# Patient Record
(demographics unavailable — no encounter records)

---

## 2025-02-27 NOTE — ADDENDUM
[FreeTextEntry1] : I, Markie Wesley wrote this note acting as a scribe for Dr. Mauro Britton on 02/27/2025.   All medical record entries made by the Scribe were at my, Dr. Mauro Britton M.D., direction and personally dictated by me on 02/27/2025. I have personally reviewed the chart and agree that the record accurately reflects my personal performance of the history, physical exam, assessment and plan

## 2025-02-27 NOTE — DISCUSSION/SUMMARY
[de-identified] : The underlying pathophysiology was reviewed with the patient. XR films were reviewed with the patient. Discussed at length the nature of the patients condition. I reassured the patient that his right femur fracture has fully healed. Treatment options were discussed including; observation, NSAIDS, analgesics, injection, physical therapy, and surgical intervention.  Activity modifications were reviewed with the patient at length. Patient was advised to take OTC medications and topical analgesic for pain management. I recommend that the patient utilize 4% lidocaine patches.  The patient will continue his home exercises.  Activity modification was discussed in great detail. I recommend that the patient utilize a wrist brace when lifting heavy objects.  All questions answered, understanding verbalized. Patient in agreement with plan of care. Patient was advised to follow up in 6 months or as needed. If the patient begins to experience any changes or severe exacerbation of his symptoms, he should reach out to me as soon as possible.

## 2025-02-27 NOTE — HISTORY OF PRESENT ILLNESS
[Has the patient missed work because of the injury/illness?] : The patient has missed work because of the injury/illness. [No] : The patient is currently not working. [de-identified] : Today, Feb 27, 2025, the patient presents for follow-up and further management. He states that he continues to have pain pertaining to both his wrist and right femur, knee, and hip. The patient reports no significant changes to their symptoms since their last visit. He presents today in his shoulder sling and is ambulating with a walking cane. He reports difficulty sleeping at night due to his pain. However, he does note good ROM. The patient has finished his physical therapy script and was denied more sessions by worker's compensation. He has not been able to return to work He recently had right shoulder surgery at an outside facility. He has a right shoulder abduction brace on. [FreeTextEntry1] : Pt is a 30 y/o male with left wrist and right femur fractures as well as right wrist injury secondary to a fall off of a roof while working on 12/1/22. He was seen at [facility] ED, where had had left wrist proximal row carpectomy and right femur ORIF. He recovered at [facility] from 12/12/22-12/23/22. He followed up with his original surgeon on 12/23/23. He was placed into a removable splint and referred for OT. He ultimately opted to switch his care and began seeing me on 1/5/23. 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 He underwent ORIF of the right wrist scapholunate dissociation on 1/13/23, with hardware removal on 03/24/2023. He has since followed up here for all of the above documented injuries. He continues to have pain. 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 He returned on 7/20/23 and notes a great deal of increased pain at the left wrist. He denies further injury. He also again complained of pain at the right lower extremity, given the nonunion. He had a recent CAT scan which confirmed the nonunion.  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 He has obtained the op report of the femoral nailing . It is a Vikki Recon nail He returns today complaining of wrist pain, knee pain, and shoulder pain.  On, Apr 04, 2024 patient was here for a follow up and further evaluation. He complains of pain on the right wrist. He would also like to ask questions about the surgery he will be having.   On, Jul 15, 2024 patient was here for a follow up and further evaluation. He complains of pain the left wrist and the right hip.  Today, Aug 29, 2024, the patient presents for follow-up and further management. He states that he continues to have pain pertaining to both his wrist and right femur. The patient reports no significant changes to their symptoms since their last visit. He has not been able to return to work [FreeTextEntry2] : He is an .

## 2025-02-27 NOTE — PHYSICAL EXAM
[de-identified] : Patient is WDWN, alert, and in no acute distress. Breathing is unlabored. He is grossly oriented to person, place, and time.  Patient ambulates with a cane.   Left Wrist: mild tenderness is present no edema is present full ROM with pain sensation is intact  Right Hip: mild tenderness is present no edema is present full ROM with pain sensation is intact [de-identified] : Right femur Xrays reveal a completely healed proximal femoral shaft fracture with IM nail in place,   AP, lateral and oblique views of the left Wrist were obtained today and revealed a proximal row carpectomy with mild narrowing of the capitoradial joint space

## 2025-02-27 NOTE — REASON FOR VISIT
[Follow-Up Visit] : a follow-up visit for [Workers' Comp: Date of Injury: _______] : This visit is related to worker's compensation. Date of Injury: [unfilled] [FreeTextEntry2] : s/p ORIF of right wrist scapholunate dissociation, DOS: 01/13/2023. s/p removal of three K-wires from the right wrist, DOS: 03/24/2023.